# Patient Record
Sex: MALE | Race: WHITE
[De-identification: names, ages, dates, MRNs, and addresses within clinical notes are randomized per-mention and may not be internally consistent; named-entity substitution may affect disease eponyms.]

---

## 2022-12-11 ENCOUNTER — HOSPITAL ENCOUNTER (EMERGENCY)
Dept: HOSPITAL 47 - EC | Age: 37
Discharge: HOME | End: 2022-12-11
Payer: COMMERCIAL

## 2022-12-11 VITALS — SYSTOLIC BLOOD PRESSURE: 134 MMHG | HEART RATE: 80 BPM | DIASTOLIC BLOOD PRESSURE: 78 MMHG

## 2022-12-11 VITALS — RESPIRATION RATE: 18 BRPM | TEMPERATURE: 98 F

## 2022-12-11 DIAGNOSIS — R68.83: Primary | ICD-10-CM

## 2022-12-11 DIAGNOSIS — F12.90: ICD-10-CM

## 2022-12-11 DIAGNOSIS — B27.90: ICD-10-CM

## 2022-12-11 LAB
ALBUMIN SERPL-MCNC: 4.5 G/DL (ref 3.5–5)
ALP SERPL-CCNC: 76 U/L (ref 38–126)
ALT SERPL-CCNC: 17 U/L (ref 4–49)
ANION GAP SERPL CALC-SCNC: 11 MMOL/L
APTT BLD: 24.6 SEC (ref 22–30)
AST SERPL-CCNC: 25 U/L (ref 17–59)
BASOPHILS # BLD AUTO: 0.1 K/UL (ref 0–0.2)
BASOPHILS NFR BLD AUTO: 1 %
BUN SERPL-SCNC: 16 MG/DL (ref 9–20)
CALCIUM SPEC-MCNC: 9.3 MG/DL (ref 8.4–10.2)
CHLORIDE SERPL-SCNC: 106 MMOL/L (ref 98–107)
CO2 SERPL-SCNC: 24 MMOL/L (ref 22–30)
EOSINOPHIL # BLD AUTO: 0.1 K/UL (ref 0–0.7)
EOSINOPHIL NFR BLD AUTO: 1 %
ERYTHROCYTE [DISTWIDTH] IN BLOOD BY AUTOMATED COUNT: 4.34 M/UL (ref 4.3–5.9)
ERYTHROCYTE [DISTWIDTH] IN BLOOD: 13.1 % (ref 11.5–15.5)
FERRITIN SERPL-MCNC: 178 NG/ML (ref 22–322)
GLUCOSE SERPL-MCNC: 101 MG/DL (ref 74–99)
HCT VFR BLD AUTO: 39.3 % (ref 39–53)
HGB BLD-MCNC: 13.6 GM/DL (ref 13–17.5)
INR PPP: 1 (ref ?–1.2)
IRON SERPL-MCNC: 116 UG/DL (ref 65–175)
LYMPHOCYTES # SPEC AUTO: 2.1 K/UL (ref 1–4.8)
LYMPHOCYTES NFR SPEC AUTO: 19 %
MCH RBC QN AUTO: 31.4 PG (ref 25–35)
MCHC RBC AUTO-ENTMCNC: 34.7 G/DL (ref 31–37)
MCV RBC AUTO: 90.5 FL (ref 80–100)
MONOCYTES # BLD AUTO: 0.4 K/UL (ref 0–1)
MONOCYTES NFR BLD AUTO: 4 %
NEUTROPHILS # BLD AUTO: 8.3 K/UL (ref 1.3–7.7)
NEUTROPHILS NFR BLD AUTO: 75 %
PLATELET # BLD AUTO: 236 K/UL (ref 150–450)
POTASSIUM SERPL-SCNC: 4.1 MMOL/L (ref 3.5–5.1)
PROT SERPL-MCNC: 7.6 G/DL (ref 6.3–8.2)
PT BLD: 10.8 SEC (ref 9–12)
SODIUM SERPL-SCNC: 141 MMOL/L (ref 137–145)
TIBC SERPL-MCNC: 286 UG/DL (ref 228–460)
WBC # BLD AUTO: 11.2 K/UL (ref 3.8–10.6)

## 2022-12-11 PROCEDURE — 85652 RBC SED RATE AUTOMATED: CPT

## 2022-12-11 PROCEDURE — 36415 COLL VENOUS BLD VENIPUNCTURE: CPT

## 2022-12-11 PROCEDURE — 82728 ASSAY OF FERRITIN: CPT

## 2022-12-11 PROCEDURE — 85730 THROMBOPLASTIN TIME PARTIAL: CPT

## 2022-12-11 PROCEDURE — 99283 EMERGENCY DEPT VISIT LOW MDM: CPT

## 2022-12-11 PROCEDURE — 84443 ASSAY THYROID STIM HORMONE: CPT

## 2022-12-11 PROCEDURE — 84484 ASSAY OF TROPONIN QUANT: CPT

## 2022-12-11 PROCEDURE — 86308 HETEROPHILE ANTIBODY SCREEN: CPT

## 2022-12-11 PROCEDURE — 86140 C-REACTIVE PROTEIN: CPT

## 2022-12-11 PROCEDURE — 96374 THER/PROPH/DIAG INJ IV PUSH: CPT

## 2022-12-11 PROCEDURE — 83550 IRON BINDING TEST: CPT

## 2022-12-11 PROCEDURE — 83540 ASSAY OF IRON: CPT

## 2022-12-11 PROCEDURE — 80053 COMPREHEN METABOLIC PANEL: CPT

## 2022-12-11 PROCEDURE — 85025 COMPLETE CBC W/AUTO DIFF WBC: CPT

## 2022-12-11 PROCEDURE — 99284 EMERGENCY DEPT VISIT MOD MDM: CPT

## 2022-12-11 PROCEDURE — 85610 PROTHROMBIN TIME: CPT

## 2022-12-11 NOTE — ED
General Adult HPI





- General


Chief complaint: Recheck/Abnormal Lab/Rx


Stated complaint: Chills


Time Seen by Provider: 22 11:26


Source: patient, RN notes reviewed


Mode of arrival: ambulatory


Limitations: no limitations





- History of Present Illness


Initial comments: 


This is a 37-year-old male who presents to the emergency department for "feeling

cold all over".  Symptoms have been present for the last month.  He has 

associated constipation.  Denies any hair loss or mood changes.  His daughter 

does have thyroid problems.  He's never been diagnosed with any endocrine or 

autoimmune disorders.  Denies any upper respiratory symptoms.  Sates that he has

to turn on the heater in his bathroom and lay next to it for 4 hours in order to

feel better.





Denies any fevers, chills, sore throat, cough, dyspnea, chest pain, 

palpitations, abdominal pain, nausea, vomiting, diarrhea, back pain, or 

headaches.





MD Complaint: chills


Onset/Timin


-: month(s)





- Related Data


                                  Previous Rx's











 Medication  Instructions  Recorded


 


predniSONE 50 mg PO DAILY 5 Days #5 tab 22











                                    Allergies











Allergy/AdvReac Type Severity Reaction Status Date / Time


 


No Known Allergies Allergy   Verified 22 10:46














Review of Systems


ROS Statement: 


Those systems with pertinent positive or pertinent negative responses have been 

documented in the HPI.





ROS Other: All systems not noted in ROS Statement are negative.





Past Medical History


History of Any Multi-Drug Resistant Organisms: None Reported


Past Surgical History: Heart Catheterization


Past Psychological History: No Psychological Hx Reported, ADD/ADHD


Smoking Status: Never smoker


Past Alcohol Use History: None Reported


Past Drug Use History: Marijuana





General Exam


Limitations: no limitations


General appearance: alert, in no apparent distress


Head exam: Present: atraumatic, normocephalic, normal inspection


ENT exam: Present: normal exam, mucous membranes moist, TM's normal bilaterally,

normal external ear exam


Neck exam: Present: normal inspection.  Absent: tenderness, meningismus, 

lymphadenopathy


Respiratory exam: Present: normal lung sounds bilaterally.  Absent: respiratory 

distress, wheezes, rales, rhonchi, stridor


Cardiovascular Exam: Present: regular rate, normal rhythm, normal heart sounds. 

Absent: systolic murmur, diastolic murmur, rubs, gallop, clicks


GI/Abdominal exam: Present: soft, normal bowel sounds.  Absent: distended, 

tenderness, guarding, rebound, rigid


Neurological exam: Present: alert, oriented X3, CN II-XII intact


Psychiatric exam: Present: normal affect, normal mood


Skin exam: Present: warm, dry, intact, normal color.  Absent: rash





Course


                                   Vital Signs











  22





  10:44


 


Temperature 98 F


 


Pulse Rate 108 H


 


Respiratory 18





Rate 


 


Blood Pressure 122/88


 


O2 Sat by Pulse 100





Oximetry 














Medical Decision Making





- Medical Decision Making


This is a 37-year-old male who presents to the emergency department for feeling 

cold.  Lab work reveals minor leukocytosis but was negative for any signs of 

systemic inflammation or thyroid irregularities.  Patient did test positive for 

mononucleosis based on a positive heterophile.  He was given a dose of IV 

Decadron to help with any swelling and inflammation he may have.  Advised that 

he discuss the possibility of any long-term complications with his primary care 

provider due to the duration of his symptoms.  He is instructed to avoid any 

contact sports or high impact activity due to the risk of injuring the spleen.  

Also advised he try to stay warm with a heated blanket, thick socks, a hat, and 

wearing multiple layers.  I also advised that heat is easily lost through the 

hands and the feet and he should do his best to keep these areas warm.





Return precautions reviewed in depth, the patient is instructed to return to the

emergency department with any new, worsening, or concerning symptoms. Patient 

verbalized understanding. 





This case was discussed in detail with the attending ED physician. Presentation,

findings, and treatment plan discussed in detail as well. 








- Lab Data


Result diagrams: 


                                 22 11:50





                                 22 11:50


                                   Lab Results











  22 Range/Units





  11:50 11:50 11:50 


 


WBC  11.2 H    (3.8-10.6)  k/uL


 


RBC  4.34    (4.30-5.90)  m/uL


 


Hgb  13.6    (13.0-17.5)  gm/dL


 


Hct  39.3    (39.0-53.0)  %


 


MCV  90.5    (80.0-100.0)  fL


 


MCH  31.4    (25.0-35.0)  pg


 


MCHC  34.7    (31.0-37.0)  g/dL


 


RDW  13.1    (11.5-15.5)  %


 


Plt Count  236    (150-450)  k/uL


 


MPV  8.6    


 


Neutrophils %  75    %


 


Lymphocytes %  19    %


 


Monocytes %  4    %


 


Eosinophils %  1    %


 


Basophils %  1    %


 


Neutrophils #  8.3 H    (1.3-7.7)  k/uL


 


Lymphocytes #  2.1    (1.0-4.8)  k/uL


 


Monocytes #  0.4    (0-1.0)  k/uL


 


Eosinophils #  0.1    (0-0.7)  k/uL


 


Basophils #  0.1    (0-0.2)  k/uL


 


PT   10.8   (9.0-12.0)  sec


 


INR   1.0   (<1.2)  


 


APTT   24.6   (22.0-30.0)  sec


 


Sodium    141  (137-145)  mmol/L


 


Potassium    4.1  (3.5-5.1)  mmol/L


 


Chloride    106  ()  mmol/L


 


Carbon Dioxide    24  (22-30)  mmol/L


 


Anion Gap    11  mmol/L


 


BUN    16  (9-20)  mg/dL


 


Creatinine    0.74  (0.66-1.25)  mg/dL


 


Est GFR (CKD-EPI)AfAm    >90  (>60 ml/min/1.73 sqM)  


 


Est GFR (CKD-EPI)NonAf    >90  (>60 ml/min/1.73 sqM)  


 


Glucose    101 H  (74-99)  mg/dL


 


Calcium    9.3  (8.4-10.2)  mg/dL


 


Total Bilirubin    0.7  (0.2-1.3)  mg/dL


 


AST    25  (17-59)  U/L


 


ALT    17  (4-49)  U/L


 


Alkaline Phosphatase    76  ()  U/L


 


Troponin I     (0.000-0.034)  ng/mL


 


C-Reactive Protein     (<1.0)  mg/dL


 


Total Protein    7.6  (6.3-8.2)  g/dL


 


Albumin    4.5  (3.5-5.0)  g/dL


 


TSH    1.260  (0.465-4.680)  mIU/L


 


Heterophile Antibody     (Negative)  














  22 Range/Units





  11:50 12:06 12:06 


 


WBC     (3.8-10.6)  k/uL


 


RBC     (4.30-5.90)  m/uL


 


Hgb     (13.0-17.5)  gm/dL


 


Hct     (39.0-53.0)  %


 


MCV     (80.0-100.0)  fL


 


MCH     (25.0-35.0)  pg


 


MCHC     (31.0-37.0)  g/dL


 


RDW     (11.5-15.5)  %


 


Plt Count     (150-450)  k/uL


 


MPV     


 


Neutrophils %     %


 


Lymphocytes %     %


 


Monocytes %     %


 


Eosinophils %     %


 


Basophils %     %


 


Neutrophils #     (1.3-7.7)  k/uL


 


Lymphocytes #     (1.0-4.8)  k/uL


 


Monocytes #     (0-1.0)  k/uL


 


Eosinophils #     (0-0.7)  k/uL


 


Basophils #     (0-0.2)  k/uL


 


PT     (9.0-12.0)  sec


 


INR     (<1.2)  


 


APTT     (22.0-30.0)  sec


 


Sodium     (137-145)  mmol/L


 


Potassium     (3.5-5.1)  mmol/L


 


Chloride     ()  mmol/L


 


Carbon Dioxide     (22-30)  mmol/L


 


Anion Gap     mmol/L


 


BUN     (9-20)  mg/dL


 


Creatinine     (0.66-1.25)  mg/dL


 


Est GFR (CKD-EPI)AfAm     (>60 ml/min/1.73 sqM)  


 


Est GFR (CKD-EPI)NonAf     (>60 ml/min/1.73 sqM)  


 


Glucose     (74-99)  mg/dL


 


Calcium     (8.4-10.2)  mg/dL


 


Total Bilirubin     (0.2-1.3)  mg/dL


 


AST     (17-59)  U/L


 


ALT     (4-49)  U/L


 


Alkaline Phosphatase     ()  U/L


 


Troponin I  <0.012    (0.000-0.034)  ng/mL


 


C-Reactive Protein    <0.5  (<1.0)  mg/dL


 


Total Protein     (6.3-8.2)  g/dL


 


Albumin     (3.5-5.0)  g/dL


 


TSH     (0.465-4.680)  mIU/L


 


Heterophile Antibody   Positive   (Negative)  














Disposition


Clinical Impression: 


 Mononucleosis, Chills





Disposition: HOME SELF-CARE


Instructions (If sedation given, give patient instructions):  Mononucleosis (ED)


Additional Instructions: 


Return to the emergency department with any new, worsening, or concerning 

symptoms. Follow up with your primary care provider in 1-2 days to discuss if 

any additional testing is indicated to look into possible longterm complications

from mono. Try to stay warm by using heated blankets, wearing thick layers, 

heavy socks, and hats. Most of our heat is lost through our hands and our feet 

so it is important to keep those areas warm.  Avoid any sports or high impact 

activities to prevent any trauma to the abdomen.


Is patient prescribed a controlled substance at d/c from ED?: No


Referrals: 


Cedric Torres MD [Primary Care Provider] - 1-2 days